# Patient Record
Sex: MALE | URBAN - METROPOLITAN AREA
[De-identification: names, ages, dates, MRNs, and addresses within clinical notes are randomized per-mention and may not be internally consistent; named-entity substitution may affect disease eponyms.]

---

## 2022-11-19 ENCOUNTER — NURSE TRIAGE (OUTPATIENT)
Dept: NURSING | Facility: CLINIC | Age: 22
End: 2022-11-19

## 2022-11-20 NOTE — TELEPHONE ENCOUNTER
Sven calls and says that he has pain in his top 2 back teeth- on the right side of his mouth. Pt. Says that the pain began 2 days ago. Pt. Says that the pain = 9/10 if he eats and 7/10 if he does not eat. Pt. Also says that he has a headache. Headache began 2 days ago. Pt. Says that he will call his clinic on Monday. RN also gave pt. The phone # to the [facility]. COVID 19 Nurse Triage Plan/Patient Instructions    Please be aware that novel coronavirus (COVID-19) may be circulating in the community. If you develop symptoms such as fever, cough, or SOB or if you have concerns about the presence of another infection including coronavirus (COVID-19), please contact your health care provider or visit [email or web address].     Disposition/Instructions    Virtual Visit with provider recommended. Reference Visit Selection Guide.    Thank you for taking steps to prevent the spread of this virus.  o Limit your contact with others.  o Wear a simple mask to cover your cough.  o Wash your hands well and often.    Resources    [facility]: About COVID-19: [email or web address]    CDC: What to Do If You're Sick: www.cdc.gov/coronavirus/2019-ncov/about/steps-when-sick.html    CDC: Ending Home Isolation: www.cdc.gov/coronavirus/2019-ncov/hcp/disposition-in-home-patients.html     CDC: Caring for Someone: www.cdc.gov/coronavirus/2019-ncov/if-you-are-sick/care-for-someone.html     [facility]: Interim Guidance for Hospital Discharge to Home: www.health.[facility].[place]/diseases/coronavirus/hcp/hospdischarge.pdf    [facility] clinical trials (COVID-19 research studies): [email or web address]-[email or web address]     Below are the COVID-19 hotlines at the [place] [facility] ([facility]). Interpreters are available.   o For health questions: Call 713-359-6914 or 1-234.842.6622 (7 a.m. to 7 p.m.)  o For questions about schools and childcare: Call 877-808-0988 or 1-479.761.8845 (7  "a.m. to 7 p.m.)                   Reason for Disposition    Tooth is painful or swelling around a tooth    Toothache present > 24 hours    Additional Information    Negative: SEVERE difficulty breathing (e.g., struggling for each breath, speaks in single words, stridor)    Negative: Sounds like a life-threatening emergency to the triager    Negative: Followed a tooth (or teeth) injury    Negative: Followed a mouth injury    Negative: Throat is painful    Negative: Canker sore suspected (i.e., aphthous ulcer) in the mouth    Negative: Cold sore suspected (i.e., fever blister sore) on the outer lip    Negative: Shock suspected (e.g., cold/pale/clammy skin, too weak to stand, low BP, rapid pulse)    Negative: [1] Similar pain previously AND [2] it was from \"heart attack\"    Negative: [1] Similar pain previously AND [2] it was from \"angina\" AND [3] not relieved by nitroglycerin    Negative: Sounds like a life-threatening emergency to the triager    Negative: Chest pain    Negative: Toothache followed tooth injury    Negative: Toothache or mouth pain after tooth extraction    Negative: Canker sore (i.e., aphthous ulcer)    Negative: Tongue is very swollen and tender    Negative: [1] Face is swollen AND [2] fever    Negative: Patient sounds very sick or weak to the triager    Negative: [1] SEVERE pain (e.g., excruciating, unable to do any normal activities) AND [2] not improved 2 hours after pain medicine    Negative: Face is very swollen    Negative: Fever    Negative: [1] Face is swollen AND [2] no fever    Protocols used: MOUTH PAIN-A-AH, TOOTHACHE-A-AH    "